# Patient Record
Sex: FEMALE | Race: WHITE
[De-identification: names, ages, dates, MRNs, and addresses within clinical notes are randomized per-mention and may not be internally consistent; named-entity substitution may affect disease eponyms.]

---

## 2017-01-29 ENCOUNTER — HOSPITAL ENCOUNTER (EMERGENCY)
Dept: HOSPITAL 62 - ER | Age: 52
Discharge: HOME | End: 2017-01-29
Payer: COMMERCIAL

## 2017-01-29 VITALS — SYSTOLIC BLOOD PRESSURE: 124 MMHG | DIASTOLIC BLOOD PRESSURE: 68 MMHG

## 2017-01-29 DIAGNOSIS — F17.210: ICD-10-CM
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
## 2017-01-29 DIAGNOSIS — M25.462: ICD-10-CM

## 2017-01-29 DIAGNOSIS — S80.212A: Primary | ICD-10-CM

## 2017-01-29 DIAGNOSIS — Z98.890: ICD-10-CM

## 2017-01-29 PROCEDURE — 90471 IMMUNIZATION ADMIN: CPT

## 2017-01-29 PROCEDURE — 90715 TDAP VACCINE 7 YRS/> IM: CPT

## 2017-01-29 PROCEDURE — 73562 X-RAY EXAM OF KNEE 3: CPT

## 2017-01-29 PROCEDURE — L1830 KO IMMOB CANVAS LONG PRE OTS: HCPCS

## 2017-01-29 PROCEDURE — 99283 EMERGENCY DEPT VISIT LOW MDM: CPT

## 2017-01-29 NOTE — ER DOCUMENT REPORT
HPI





- HPI


Patient complains to provider of: knee injury


Onset: Yesterday


Onset/Duration: Sudden


Quality of pain: Sharp


Pain Level: 5


Context: 


Patient states she tripped over someone else's leg falling on her left knee 

yesterday.  Patient complains of pain and swelling.


Associated Symptoms: Other - Left knee pain.  denies: Fever


Exacerbated by: Movement, Walking


Relieved by: Denies


Similar symptoms previously: No


Recently seen / treated by doctor: No





- ROS


ROS below otherwise negative: Yes


Systems Reviewed and Negative: Yes All other systems reviewed and negative





- CONSTITUTIONAL


Constitutional: DENIES: Fever





- MUSCULOSKELETAL


Musculoskeletal: REPORTS: Extremity pain, Swelling





- DERM


Skin Problems: Abrasion





- NURSING COMMENTS


Comment: Pt arrived to ED with c/o severe left knee pain s/p tripping yesterday 

at work and falling on left knee. Pt states she tried to ice and elevate leg 

but pain has increased. Pt has half dollar size abrasion to left knee with 

redness around site. Pt has increased pain with ambulations. Pt has slight 

swelling to left knee. Pt has even rise and fall of chest. Pt c/o severe pain 

to knee at this time. Family at bedside. Will continue to monitor.





Past Medical History





- General


Information source: Patient





- Social History


Smoking Status: Current Every Day Smoker


Cigarette use (# per day): Yes


Chew tobacco use (# tins/day): No


Frequency of alcohol use: Social


Drug Abuse: None


Occupation: restaurant


Family History: Reviewed & Not Pertinent


Patient has suicidal ideation: No


Patient has homicidal ideation: No


Renal/ Medical History: Denies: Hx Peritoneal Dialysis


Psychiatric Medical History: Reports: Hx Depression


Past Surgical History: Reports: Hx Orthopedic Surgery - b/l knees





- Immunizations


Hx Diphtheria, Pertussis, Tetanus Vaccination: No





Vertical Provider Document





- CONSTITUTIONAL


Exam Limitations: No Limitations


General Appearance: WD/WN, No Apparent Distress





- INFECTION CONTROL


TRAVEL OUTSIDE OF THE [place] IN LAST 30 DAYS: No





- HEENT


HEENT: Atraumatic, Normocephalic





- NECK


Neck: Normal Inspection





- RESPIRATORY


Respiratory: No Respiratory Distress


O2 Sat by Pulse Oximetry: 96





- CARDIOVASCULAR


Pulses: Normal: Dorsalis pedis





- BACK


Back: Normal Inspection





- MUSCULOSKELETAL/EXTREMETIES


Musculoskeletal/Extremeties: MAEW, Tender - Patient with tenderness over left 

patella, patient with small joint effusion to left knee.  No laxity with varus 

or valgus maneuvers.  Patellar tendon intact.  Patient with overlying abrasion 

to left knee, Edema





- NEURO


Level of Consciousness: Awake, Alert, Appropriate


Motor/Sensory: No Motor Deficit





- DERM


Integumentary: Warm, Dry


Notes: 


Abrasion to left knee





Course





- Vital Signs


Vital signs: 


 











Temp Pulse Resp BP Pulse Ox


 


 98.1 F   86   20   128/72 H  96 


 


 01/29/17 09:40  01/29/17 09:41  01/29/17 09:40  01/29/17 09:40  01/29/17 09:40














- Diagnostic Test


Radiology reviewed: Reports reviewed





Procedures





- Immobilization


  ** Left Knee


Pre-Proc Neuro Vasc Exam: Normal


Immobilizer type: Knee immobilizer


Performed by: PCT


Post-Proc Neuro Vasc Exam: Normal


Alignment checked and good: Yes





Discharge





- Discharge


Clinical Impression: 


 Knee effusion, left, Abrasion





Condition: Stable


Disposition: HOME, SELF-CARE


Instructions:  Knee Immobilizing Splint ([facility]), Sprained Knee ([facility]), Oral 

Narcotic Medication ([facility]), Suspected Internal Knee Injury ([facility]), Use of 

Crutches ([facility]), Ice & Elevation ([facility]), Tetanus Immunization Given ([facility]), 

Abrasions ([facility])


Additional Instructions: 


Return immediately for any new or worsening symptoms





Followup with your primary care provider, call tomorrow to make a followup 

appointment





Follow up with orthopedic Dr. for any continued pain or problems





Weightbearing as tolerated





Wear immobilizer for the next 4-5 days and then remove.


Prescriptions: 


Oxycodone HCl/Acetaminophen [Percocet 5-325 mg Tablet] 1 tab PO ASDIR PRN #15 

tablet


 PRN Reason: 


Forms:  Return to Work


Referrals: 


[facility] [Provider Group] - Follow up as needed

## 2019-02-23 ENCOUNTER — HOSPITAL ENCOUNTER (EMERGENCY)
Dept: HOSPITAL 62 - ER | Age: 54
Discharge: HOME | End: 2019-02-23
Payer: COMMERCIAL

## 2019-02-23 VITALS — SYSTOLIC BLOOD PRESSURE: 136 MMHG | DIASTOLIC BLOOD PRESSURE: 60 MMHG

## 2019-02-23 DIAGNOSIS — R51: ICD-10-CM

## 2019-02-23 DIAGNOSIS — R10.817: ICD-10-CM

## 2019-02-23 DIAGNOSIS — R10.9: ICD-10-CM

## 2019-02-23 DIAGNOSIS — R19.7: ICD-10-CM

## 2019-02-23 DIAGNOSIS — F17.210: ICD-10-CM

## 2019-02-23 DIAGNOSIS — R11.2: Primary | ICD-10-CM

## 2019-02-23 DIAGNOSIS — R05: ICD-10-CM

## 2019-02-23 DIAGNOSIS — R50.9: ICD-10-CM

## 2019-02-23 DIAGNOSIS — R09.81: ICD-10-CM

## 2019-02-23 DIAGNOSIS — R74.0: ICD-10-CM

## 2019-02-23 LAB
A TYPE INFLUENZA AG: NEGATIVE
ADD MANUAL DIFF: NO
ALBUMIN SERPL-MCNC: 4.4 G/DL (ref 3.5–5)
ALP SERPL-CCNC: 103 U/L (ref 38–126)
ALT SERPL-CCNC: 53 U/L (ref 9–52)
ANION GAP SERPL CALC-SCNC: 11 MMOL/L (ref 5–19)
AST SERPL-CCNC: 52 U/L (ref 14–36)
B INFLUENZA AG: NEGATIVE
BASOPHILS # BLD AUTO: 0 10^3/UL (ref 0–0.2)
BASOPHILS NFR BLD AUTO: 0.5 % (ref 0–2)
BILIRUB DIRECT SERPL-MCNC: 0.3 MG/DL (ref 0–0.4)
BILIRUB SERPL-MCNC: 0.4 MG/DL (ref 0.2–1.3)
BUN SERPL-MCNC: 21 MG/DL (ref 7–20)
CALCIUM: 9.2 MG/DL (ref 8.4–10.2)
CHLORIDE SERPL-SCNC: 97 MMOL/L (ref 98–107)
CO2 SERPL-SCNC: 29 MMOL/L (ref 22–30)
EOSINOPHIL # BLD AUTO: 0 10^3/UL (ref 0–0.6)
EOSINOPHIL NFR BLD AUTO: 0 % (ref 0–6)
ERYTHROCYTE [DISTWIDTH] IN BLOOD BY AUTOMATED COUNT: 13.5 % (ref 11.5–14)
GLUCOSE SERPL-MCNC: 137 MG/DL (ref 75–110)
HCT VFR BLD CALC: 49.9 % (ref 36–47)
HGB BLD-MCNC: 17.4 G/DL (ref 12–15.5)
LIPASE SERPL-CCNC: 72.1 U/L (ref 23–300)
LYMPHOCYTES # BLD AUTO: 0.6 10^3/UL (ref 0.5–4.7)
LYMPHOCYTES NFR BLD AUTO: 9.8 % (ref 13–45)
MCH RBC QN AUTO: 31.5 PG (ref 27–33.4)
MCHC RBC AUTO-ENTMCNC: 34.8 G/DL (ref 32–36)
MCV RBC AUTO: 91 FL (ref 80–97)
MONOCYTES # BLD AUTO: 0.4 10^3/UL (ref 0.1–1.4)
MONOCYTES NFR BLD AUTO: 6.4 % (ref 3–13)
NEUTROPHILS # BLD AUTO: 5.2 10^3/UL (ref 1.7–8.2)
NEUTS SEG NFR BLD AUTO: 83.3 % (ref 42–78)
PLATELET # BLD: 226 10^3/UL (ref 150–450)
POTASSIUM SERPL-SCNC: 4.2 MMOL/L (ref 3.6–5)
PROT SERPL-MCNC: 7 G/DL (ref 6.3–8.2)
RBC # BLD AUTO: 5.51 10^6/UL (ref 3.72–5.28)
SODIUM SERPL-SCNC: 137.4 MMOL/L (ref 137–145)
TOTAL CELLS COUNTED % (AUTO): 100 %
WBC # BLD AUTO: 6.3 10^3/UL (ref 4–10.5)

## 2019-02-23 PROCEDURE — 71046 X-RAY EXAM CHEST 2 VIEWS: CPT

## 2019-02-23 PROCEDURE — 83690 ASSAY OF LIPASE: CPT

## 2019-02-23 PROCEDURE — 36415 COLL VENOUS BLD VENIPUNCTURE: CPT

## 2019-02-23 PROCEDURE — 99283 EMERGENCY DEPT VISIT LOW MDM: CPT

## 2019-02-23 PROCEDURE — 80053 COMPREHEN METABOLIC PANEL: CPT

## 2019-02-23 PROCEDURE — 87804 INFLUENZA ASSAY W/OPTIC: CPT

## 2019-02-23 PROCEDURE — 85025 COMPLETE CBC W/AUTO DIFF WBC: CPT

## 2019-02-23 PROCEDURE — 96374 THER/PROPH/DIAG INJ IV PUSH: CPT

## 2019-02-23 NOTE — RADIOLOGY REPORT (SQ)
CLINICAL HISTORY:  cough, fever, vomiting 



COMPARISON: None.



TECHNIQUE: XR CHEST 2 VIEWS 2/23/2019 4:15 AM CST



FINDINGS: 



Cardiac silhouette is normal in size. Lungs are clear without

consolidation, atelectasis, mass or edema. There is no pleural

effusion. There is no pneumothorax. There are no acute osseous

findings.



IMPRESSION: 



Clear lungs.

## 2019-02-23 NOTE — ER DOCUMENT REPORT
Entered by MORIS FERGUSON SCRIBE  02/23/19 0422 





Acting as scribe for:GIAN ARNDT DO





ED Flu Like





- General


Chief Complaint: Flu Symptoms


Stated Complaint: FLU LIKE SYMPTOMS


Time Seen by Provider: 02/23/19 04:08


Primary Care Provider: 


ALFREDO BRIDGES MD [Primary Care Provider] - Follow up as needed


Mode of Arrival: Ambulatory


Information source: Patient


Notes: 


Patient is a 53 year old female presenting to the emergency department complain

ing of multiple symptoms including cough, congestion and a fever onset 4 days 

ago. Patient states she initially attributed her cough to a chest cold but 

states her symptoms have been worsening. She states she is unable to hold any 

food or liquids down further stating she has been vomiting and having diarrhea 

since earlier this evening. She also complains of abdominal pain but attributes 

this to coughing and vomiting further stating "I'm going to get a six pack". She

also reports a fever of 102.0 as well as a headache and nasal congestion. She 

reports taking her parents to the doctors office 4 days ago. She denies any 

hematemeis or blood in stool. 





TRAVEL OUTSIDE OF THE [place] IN LAST 30 DAYS: No





- Related Data


Allergies/Adverse Reactions: 


                                        





No Known Allergies Allergy (Unverified 01/29/17 09:44)


   











Past Medical History





- General


Information source: Patient





- Social History


Smoking Status: Current Every Day Smoker


Cigarette use (# per day): Yes


Smoking Education Provided: No


Frequency of alcohol use: Social


Drug Abuse: None


Family History: Reviewed & Not Pertinent


Psychiatric Medical History: Reports: Hx Depression


Past Surgical History: Reports: Hx Orthopedic Surgery - b/l knees





- Immunizations


Hx Diphtheria, Pertussis, Tetanus Vaccination: No





Review of Systems





- Review of Systems


Constitutional: See HPI


EENT: See HPI


Cardiovascular: No symptoms reported


Respiratory: See HPI, Cough


Gastrointestinal: See HPI, Abdominal pain, Diarrhea, Vomiting


Genitourinary: No symptoms reported


Female Genitourinary: No symptoms reported


Musculoskeletal: No symptoms reported


Skin: No symptoms reported


Hematologic/Lymphatic: No symptoms reported


Neurological/Psychological: No symptoms reported


-: Yes All other systems reviewed and negative





Physical Exam





- Vital signs


Vitals: 


                                        











Temp Pulse Resp BP Pulse Ox


 


 97.9 F   88   17   139/79 H  93 


 


 02/23/19 03:48  02/23/19 03:48  02/23/19 03:48  02/23/19 03:48  02/23/19 03:48














- Notes


Notes: 


GENERAL: Alert, interacts well. No acute distress but does appear mildly 

uncomfortable.


HEAD: Normocephalic, atraumatic.


EYES: Pupils equal, round, and reactive to light. Extraocular movements intact.


ENT: Oral mucosa moist, tongue midline. Mild nasal congestion. 


NECK: Full range of motion. Supple. Trachea midline.


LUNGS: Clear to auscultation bilaterally, no wheezes, rales, or rhonchi. No 

respiratory distress.


HEART: Regular rate and rhythm. No murmurs, gallops, or rubs.


ABDOMEN: Soft, diffusely very mild tenderness to palpation. Non-distended. Bowel

sounds present in all 4 quadrants.


EXTREMITIES: Moves all 4 extremities spontaneously. 


NEUROLOGICAL: Alert and oriented x3. Normal speech. 


PSYCH: Normal affect, normal mood.


SKIN: Warm, dry, normal turgor. No rashes or lesions noted.











Course





- Re-evaluation


Re-evalutation: 





02/23/19 05:44


CBC unremarkable, CMP grossly unremarkable, AST and ALT are both slightly 

elevated, lipase is normal, flu swabs are negative, chest x-ray shows no acute 

process.  Vomiting has stopped, patient is feeling somewhat better.  Patient is 

being given an oral challenge of water at this time.  If she is able to hold it 

down for the next 30 minutes she will be discharged home with Zofran and Mariely hinson.





- Vital Signs


Vital signs: 


                                        











Temp Pulse Resp BP Pulse Ox


 


 97.9 F   88   17   139/79 H  93 


 


 02/23/19 03:48  02/23/19 03:48  02/23/19 03:48  02/23/19 03:48  02/23/19 03:48














- Laboratory


Result Diagrams: 


                                 02/23/19 04:25





                                 02/23/19 04:25


Laboratory results interpreted by me: 


                                        











  02/23/19 02/23/19





  04:25 04:25


 


RBC  5.51 H 


 


Hgb  17.4 H 


 


Hct  49.9 H 


 


Seg Neutrophils %  83.3 H 


 


Lymphocytes %  9.8 L 


 


Chloride   97 L


 


BUN   21 H


 


Creatinine   0.47 L


 


Glucose   137 H


 


AST   52 H


 


ALT   53 H














Discharge





- Discharge


Clinical Impression: 


 Nausea vomiting and diarrhea





Condition: Stable


Disposition: HOME, SELF-CARE


Additional Instructions: 


Vomiting





     Vomiting (or nausea without vomiting) can be caused by many other different

problems. It can mean that something's wrong with the stomach, such as ulcers or

inflammation or the intestinal tract, such as appendicitis.  But it can also be 

a symptom of a problem that has nothing to do with the stomach or intestines. 

Vomiting is common with severe headaches, earaches, tonsillitis, and kidney 

infections, etc. We see it with pneumonia or heart attacks. Drugs can cause 

nausea and vomiting. Many abdominal problems cause vomiting; for example, 

gallstones, kidney stones, pancreatitis, and intestinal obstruction (blocked 

bowels).


     In most cases, curing the vomiting depends on fixing the problem that 

caused it. For temporary relief, we may use an anti-nausea medicine. For home 

use, we can prescribe suppositories, chewable pills, pills that dissolve in the 

mouth, or liquid anti-nausea drugs. If the vomiting seems to be caused by a 

problem in the stomach, acid-suppressing drugs may be prescribed as well.


     It's important to avoid dehydration. Sip small amounts of clear liquids 

(soft drinks, tea, broth, etc) . Try to take fluids frequently even if you are 

vomiting to prevent dehydration.  Take increasing amounts of fluid and when 

liquids are being consumed successfully, advance to small amounts of bland food 

(toast, soups, mashed potatoes, etc.) until you are able to resume a regular 

diet.  Avoid aspirin, tobacco, and alcohol.


     If the vomiting worsens, if the problem that's making you vomit worsens, or

if there's evidence of bleeding in the stomach (such as black, tarry stool, or 

bloody or black vomit), you should return immediately. Also, return if abdominal

pain worsens or becomes localized to one area or you develop high fever.  Call 

your doctor if you aren't improved in 24 hours.





Diarrhea





     Diarrhea means frequent, watery stools. There are many causes. Any problem 

that keeps the intestinal tract from absorbing water from the stool can lead to 

diarrhea. 


     A sudden new diarrhea problem is usually caused by a virus, food sen

sitivity, toxic bacteria, or drugs. In this case, we expect the problem to go 

away soon. Testing is done only if you seem seriously ill from the diarrhea.


     If you have chronic diarrhea, or diarrhea that keeps coming back, we need 

to find out why. Chronic diarrhea can be due to inflammation of the bowels such 

as Crohn's disease or ulcerative colitis, food sensitivity such as intolerance 

to lactose or wheat protein, irritable bowel syndrome, and other problems. If 

your diarrhea is a significant problem but it's not clear why you have it, we'll

refer you to a specialist for further testing.


     During an episode of diarrhea, drink small amounts (two to six ounces) of 

clear liquids (soft drinks, sport drinks, herb teas, broth, etc).  Take fluids 

frequently to prevent dehydration. It's usually not a problem to take mild anti-

diarrhea medication such as Imodium.  Kaopectate and Pepto-Bismol can turn your 

poop black. As the diarrhea eases, advance to small amounts of bland food 

(mashed potato, toast) for 24 hours.


     Call the physician if blood appears in your vomit or stool, if vomiting 

lasts longer than 24 hours, if the abdominal pain worsens or becomes localized 

to one area, if you develop high fever, or if you become lightheaded and weak.





Prescriptions: 


Ondansetron [Zofran Odt 4 mg Tablet] 1 - 2 tab PO Q4H PRN #15 tab.rapdis


 PRN Reason: For Nausea/Vomiting


Promethazine HCl [Phenergan 25 mg Tablet] 1 - 2 tab PO Q6H PRN #15 tablet


 PRN Reason: 


Forms:  Return to Work


Referrals: 


ALFREDO BRIDGES MD [Primary Care Provider] - Follow up as needed


Scribe Attestation: 





02/23/19 05:46


I personally performed the services described in the documentation, reviewed and

edited the documentation which was dictated to the scribe in my presence, and it

accurately records my words and actions.





I personally performed the services described in the documentation, reviewed and

edited the documentation which was dictated to the scribe in my presence, and it

accurately records my words and actions.

## 2020-05-29 ENCOUNTER — HOSPITAL ENCOUNTER (EMERGENCY)
Dept: HOSPITAL 62 - ER | Age: 55
Discharge: HOME | End: 2020-05-29
Payer: COMMERCIAL

## 2020-05-29 VITALS — SYSTOLIC BLOOD PRESSURE: 153 MMHG | DIASTOLIC BLOOD PRESSURE: 83 MMHG

## 2020-05-29 DIAGNOSIS — Z20.828: ICD-10-CM

## 2020-05-29 DIAGNOSIS — R91.1: ICD-10-CM

## 2020-05-29 DIAGNOSIS — R74.8: Primary | ICD-10-CM

## 2020-05-29 DIAGNOSIS — R10.84: ICD-10-CM

## 2020-05-29 DIAGNOSIS — M79.10: ICD-10-CM

## 2020-05-29 DIAGNOSIS — R50.9: ICD-10-CM

## 2020-05-29 DIAGNOSIS — R11.2: ICD-10-CM

## 2020-05-29 DIAGNOSIS — R53.81: ICD-10-CM

## 2020-05-29 DIAGNOSIS — F17.200: ICD-10-CM

## 2020-05-29 DIAGNOSIS — R19.7: ICD-10-CM

## 2020-05-29 DIAGNOSIS — R10.9: ICD-10-CM

## 2020-05-29 LAB
ADD MANUAL DIFF: NO
ALBUMIN SERPL-MCNC: 4.7 G/DL (ref 3.5–5)
ALP SERPL-CCNC: 88 U/L (ref 38–126)
ANION GAP SERPL CALC-SCNC: 11 MMOL/L (ref 5–19)
APPEARANCE UR: CLEAR
APTT PPP: YELLOW S
AST SERPL-CCNC: 27 U/L (ref 14–36)
BASOPHILS # BLD AUTO: 0.1 10^3/UL (ref 0–0.2)
BASOPHILS NFR BLD AUTO: 0.6 % (ref 0–2)
BILIRUB DIRECT SERPL-MCNC: 0 MG/DL (ref 0–0.4)
BILIRUB SERPL-MCNC: 0.8 MG/DL (ref 0.2–1.3)
BILIRUB UR QL STRIP: NEGATIVE
BUN SERPL-MCNC: 20 MG/DL (ref 7–20)
CALCIUM: 9.8 MG/DL (ref 8.4–10.2)
CHLORIDE SERPL-SCNC: 95 MMOL/L (ref 98–107)
CO2 SERPL-SCNC: 28 MMOL/L (ref 22–30)
EOSINOPHIL # BLD AUTO: 0 10^3/UL (ref 0–0.6)
EOSINOPHIL NFR BLD AUTO: 0.1 % (ref 0–6)
ERYTHROCYTE [DISTWIDTH] IN BLOOD BY AUTOMATED COUNT: 13.4 % (ref 11.5–14)
GLUCOSE SERPL-MCNC: 122 MG/DL (ref 75–110)
GLUCOSE UR STRIP-MCNC: NEGATIVE MG/DL
HCT VFR BLD CALC: 49.6 % (ref 36–47)
HGB BLD-MCNC: 17.3 G/DL (ref 12–15.5)
KETONES UR STRIP-MCNC: NEGATIVE MG/DL
LYMPHOCYTES # BLD AUTO: 1.5 10^3/UL (ref 0.5–4.7)
LYMPHOCYTES NFR BLD AUTO: 11.4 % (ref 13–45)
MCH RBC QN AUTO: 32.2 PG (ref 27–33.4)
MCHC RBC AUTO-ENTMCNC: 34.9 G/DL (ref 32–36)
MCV RBC AUTO: 92 FL (ref 80–97)
MONOCYTES # BLD AUTO: 1 10^3/UL (ref 0.1–1.4)
MONOCYTES NFR BLD AUTO: 7.7 % (ref 3–13)
NEUTROPHILS # BLD AUTO: 10.8 10^3/UL (ref 1.7–8.2)
NEUTS SEG NFR BLD AUTO: 80.2 % (ref 42–78)
NITRITE UR QL STRIP: NEGATIVE
PH UR STRIP: 7 [PH] (ref 5–9)
PLATELET # BLD: 382 10^3/UL (ref 150–450)
POTASSIUM SERPL-SCNC: 3.5 MMOL/L (ref 3.6–5)
PROT SERPL-MCNC: 7.7 G/DL (ref 6.3–8.2)
PROT UR STRIP-MCNC: 30 MG/DL
RBC # BLD AUTO: 5.38 10^6/UL (ref 3.72–5.28)
SP GR UR STRIP: 1.02
TOTAL CELLS COUNTED % (AUTO): 100 %
UROBILINOGEN UR-MCNC: NEGATIVE MG/DL (ref ?–2)
WBC # BLD AUTO: 13.4 10^3/UL (ref 4–10.5)

## 2020-05-29 PROCEDURE — 81001 URINALYSIS AUTO W/SCOPE: CPT

## 2020-05-29 PROCEDURE — 83690 ASSAY OF LIPASE: CPT

## 2020-05-29 PROCEDURE — 36415 COLL VENOUS BLD VENIPUNCTURE: CPT

## 2020-05-29 PROCEDURE — 87635 SARS-COV-2 COVID-19 AMP PRB: CPT

## 2020-05-29 PROCEDURE — 99284 EMERGENCY DEPT VISIT MOD MDM: CPT

## 2020-05-29 PROCEDURE — 96361 HYDRATE IV INFUSION ADD-ON: CPT

## 2020-05-29 PROCEDURE — 85025 COMPLETE CBC W/AUTO DIFF WBC: CPT

## 2020-05-29 PROCEDURE — 80053 COMPREHEN METABOLIC PANEL: CPT

## 2020-05-29 PROCEDURE — 96374 THER/PROPH/DIAG INJ IV PUSH: CPT

## 2020-05-29 PROCEDURE — 74177 CT ABD & PELVIS W/CONTRAST: CPT

## 2020-05-29 PROCEDURE — C9803 HOPD COVID-19 SPEC COLLECT: HCPCS

## 2020-05-29 PROCEDURE — 76705 ECHO EXAM OF ABDOMEN: CPT

## 2020-05-29 NOTE — ER DOCUMENT REPORT
Doctor's Note


Notes: 





05/29/20 12:01


This is a middle-aged female seen primarily by midlevel provider.  Clinically 

she appears to have a low-grade pancreatitis.  Gallbladder ultrasound was 

negative for stones or significant ductal dilatation.  She also had a CT abdomen

pelvis which showed nonspecific findings only.  Her LFTs are normal.  She denies

abuse of alcohol.  No recent changes medications.





I evaluated patient with midlevel provider.  At this time I believe she can be 

reasonably followed outpatient basis with repeat labs to be checked by her 

primary care physician within next 3 to 4 days.  She can return here for new or 

worsening symptoms.  She is to abstain from any intake of alcohol.  She will be 

treated symptomatically at this time.

## 2020-05-29 NOTE — RADIOLOGY REPORT (SQ)
EXAM DESCRIPTION:  CT ABD/PELVIS WITH IV ONLY



IMAGES COMPLETED DATE/TIME:  5/29/2020 11:02 am



REASON FOR STUDY:  abd pain, elevated lipase



COMPARISON:  None.



TECHNIQUE:  CT scan of the abdomen and pelvis performed using helical scanning technique with dynamic
 intravenous contrast injection.  No oral contrast. Images reviewed with lung, soft tissue, and bone 
windows. Reconstructed coronal and sagittal MPR images reviewed. Delayed images for evaluation of the
 urinary system also acquired. All images stored on PACS.

All CT scanners at this facility use dose modulation, iterative reconstruction, and/or weight based d
osing when appropriate to reduce radiation dose to as low as reasonably achievable (ALARA).

[facility]: Dose Right  [facility]: CareDose    [facility]: Dose Right    [facility]: Teradose 4D    [facility]: Smart Technologies



CONTRAST TYPE AND DOSE:  Contrast/concentration: Isovue 350.00 mg/ml; Total Contrast Delivered: 62.0 
ml; Total Saline Delivered: 65.0 ml



RENAL FUNCTION:  GFR > 60.



RADIATION DOSE:  CT Rad equipment meets quality standard of care and radiation dose reduction techniq
ues were employed. CTDIvol: 4.8 - 5.3 mGy. DLP: 520 mGy-cm.



LIMITATIONS:  None.



FINDINGS:  LOWER CHEST: Subpleural nodular opacity in the right lower lobe (image 10 of series 3) donte
t measures 11 x 11 mm.

LIVER: The 9 x 9 mm hypodense lesion in the left hepatic lobe (image 16 of series 3) is too small to 
characterize.  The geographic area of hypoattenuation along the falciform ligament could represent fo
sherri hepatic steatosis or profusion variant.  There is a punctate calcification within segment 2 of th
e liver (image 16 of series 3).  The portal veins are patent.

SPLEEN: No splenomegaly or splenic mass.

PANCREAS: No acute abnormality of the pancreas ; in particular, no peripancreatic inflammation, pancr
eatic ductal dilatation, or peripancreatic fluid collection.

GALLBLADDER: No abnormality that is apparent on CT.

ADRENAL GLANDS: Nonspecific mild nodular enlargement of the left adrenal gland that could represent a
denomatous hyperplasia.

RIGHT KIDNEY AND URETER: No solid mass, hydronephrosis, nephrolithiasis, hydroureter or ureterolithia
sis.

LEFT KIDNEY AND URETER: 13 x 8 mm calculus within an upper pole calyx, 4 mm calculus within a lower p
ole calyx, and parapelvic cyst.  There is no solid mass, hydronephrosis, hydroureter or ureterolithia
sis.

AORTA AND VESSELS: No aneurysm or dissection of the abdominal aorta.  Variant retroaortic left renal 
vein.

RETROPERITONEUM: No retroperitoneal adenopathy, hemorrhage or mass.

BOWEL AND PERITONEAL CAVITY: No bowel obstruction, bowel wall thickening or pericolonic/ perienteric 
inflammation.  No mesenteric adenopathy, free intraperitoneal fluid or mesenteric/ omental inflammati
on.

APPENDIX: Normal.

PELVIS: No abnormality of the uterus or adnexa that is apparent on CT.  The urinary bladder is disten
ded and normal in appearance.

ABDOMINAL WALL: No mass or hernia.

BONES: No fracture or osseous lesion.

OTHER: No other finding.



IMPRESSION:  1.  11 x 11 mm subpleural nodular opacity in the right lower lobe (image 10 of series 3)
 that could represent a nodule or round atelectasis.  A follow-up CT of the chest in 3 months is tracey
mmended.

2.  No acute abnormality of the pancreas ; in particular, no peripancreatic inflammation, pancreatic 
ductal dilatation, or peripancreatic fluid collection.

3.  13 x 8 mm left upper pole caliceal calculus and 4 mm left lower pole caliceal calculus.



TECHNICAL DOCUMENTATION:  JOB ID:  [id number]

Quality ID # [id number]: Final reports with documentation of one or more dose reduction techniques (e.g., Au
tomated exposure control, adjustment of the mA and/or kV according to patient size, use of iterative 
reconstruction technique)

 2011 [employer]- All Rights Reserved



Reading location - IP/workstation name: [email or web address]

## 2020-05-29 NOTE — RADIOLOGY REPORT (SQ)
EXAM DESCRIPTION:  U/S ABDOMEN LIMITED W/O DOP



IMAGES COMPLETED DATE/TIME:  5/29/2020 10:04 am



REASON FOR STUDY:  abd pain, elevated lipase



COMPARISON:  None.



TECHNIQUE:  Dynamic and static grayscale images acquired of the abdomen and recorded on PACS. Additio
nal selected color Doppler and spectral images recorded.



LIMITATIONS:  None.



FINDINGS:  PANCREAS: The visualized portions of the pancreas appear normal.

LIVER: Normal contour and echotexture.

LIVER VASCULATURE: Hepatopetal directional flow in the portal veins.

GALLBLADDER: The gallbladder is partially contracted and its wall measures 1.3 mm in thickness.  Ther
e is no cholelithiasis, sludge or pericholecystic fluid.

ULTRASOUND-DETECTED WEST'S SIGN: Negative.

INTRAHEPATIC DUCTS AND COMMON DUCT: The common bile duct measures 9 mm in diameter.  There is no dila
tation of the intrahepatic bile ducts.

INFERIOR VENA CAVA: Patent.

AORTA: No aneurysm.

RIGHT KIDNEY:  The right kidney measures 10.7 cm in length.  There is no hydronephrosis.

PERITONEAL AND RIGHT PLEURAL SPACE: No ascites or effusions.

OTHER: No other findings.



IMPRESSION:  Borderline dilatation of the common bile duct without an associated abnormality of the g
allbladder or dilatation of the intrahepatic bile ducts.



TECHNICAL DOCUMENTATION:  JOB ID:  [id number]

 2011 [employer]- All Rights Reserved



Reading location - IP/workstation name: [email or web address]

## 2020-05-29 NOTE — ER DOCUMENT REPORT
ED GI/





- General


Chief Complaint: Nausea/Vomiting/Diarrhea


Stated Complaint: NAUSEA,VOMITING,DIARRHEA


Time Seen by Provider: 05/29/20 08:15


Primary Care Provider: 


ALFREDO BRIDGES MD [Primary Care Provider] - Follow up in 3-5 days


Mode of Arrival: Ambulatory


Information source: Patient


Notes: 





Patient presents with a 3-day history of nausea vomiting diarrhea.  Patient 

reports vomiting once and having diarrhea once today.  Patient complains of 

generalized abdominal cramping, malaise and body aches.  Patient states that she

did have a fever of 101 at home yesterday.  Patient denies any cough or cold 

symptoms.


TRAVEL OUTSIDE OF THE [place] IN LAST 30 DAYS: No





- HPI


Patient complains to provider of: Abdominal pain, Diarrhea, Vomiting


Onset: Other - 3 days


Timing/Duration: Gradual


Quality of pain: Achy


Pain Level: 2


Location: Other - Generalized abdomen


Associated symptoms: Diarrhea, Fever, Nausea, Vomiting.  denies: Loss of 

appetite, Urinary hesitancy, Urinary frequency, Urinary retention, Urinary 

urgency


Exacerbated by: Denies


Relieved by: Denies


Similar symptoms previously: No


Recently seen / treated by doctor: No





- Related Data


Allergies/Adverse Reactions: 


                                        





No Known Allergies Allergy (Unverified 01/29/17 09:44)


   











Past Medical History





- General


Information source: Patient





- Social History


Smoking Status: Current Every Day Smoker


Frequency of alcohol use: Social


Drug Abuse: None


Occupation: Foodservice


Family History: Reviewed & Not Pertinent


Patient has homicidal ideation: No


Renal/ Medical History: Denies: Hx Peritoneal Dialysis


Psychiatric Medical History: Reports: Hx Anxiety, Hx Depression


Past Surgical History: Reports: Hx Orthopedic Surgery - b/l knees





- Immunizations


Hx Diphtheria, Pertussis, Tetanus Vaccination: No





Review of Systems





- Review of Systems


Constitutional: Fever, Weakness.  denies: Recent illness


EENT: No symptoms reported


Cardiovascular: No symptoms reported.  denies: Chest pain


Respiratory: No symptoms reported.  denies: Cough, Short of breath


Gastrointestinal: Abdominal pain, Diarrhea, Nausea, Vomiting.  denies: Black 

stools, Rectal bleeding


Genitourinary: No symptoms reported.  denies: Dysuria


Female Genitourinary: No symptoms reported


Musculoskeletal: No symptoms reported


Skin: No symptoms reported


Hematologic/Lymphatic: No symptoms reported


Neurological/Psychological: No symptoms reported





Physical Exam





- Vital signs


Vitals: 


                                        











Temp Pulse Resp BP Pulse Ox


 


 98.1 F   64   14   144/73 H  97 


 


 05/29/20 07:00  05/29/20 07:00  05/29/20 07:00  05/29/20 07:00  05/29/20 07:00














- General


General appearance: Appears well, Alert


In distress: None





- HEENT


Head: Normocephalic, Atraumatic


Eyes: Normal


Conjunctiva: Normal


Nasal: Normal


Mouth/Lips: Normal


Neck: Normal, Supple





- Respiratory


Respiratory status: No respiratory distress


Chest status: Nontender


Breath sounds: Normal.  No: Rales, Rhonchi, Stridor, Wheezing


Chest palpation: Normal





- Cardiovascular


Rhythm: Regular


Heart sounds: S1 appreciated, S2 appreciated


Murmur: No





- Abdominal


Inspection: Normal


Distension: No distension


Bowel sounds: Normal


Tenderness: Tender - Diffuse abdominal tenderness.  No: Guarding


Organomegaly: No organomegaly





- Back


Back: Normal, Nontender.  No: CVA tenderness





- Extremities


General upper extremity: Normal inspection, Normal ROM


General lower extremity: Normal inspection, Normal ROM





- Neurological


Neuro grossly intact: Yes


Cognition: Normal


[place] Coma Scale Eye Opening: Spontaneous


[place] Coma Scale Verbal: Oriented


Be Coma Scale Motor: Obeys Commands


Be Coma Scale Total: 15





- Psychological


Associated symptoms: Normal affect, Normal mood





- Skin


Skin Temperature: Warm


Skin Moisture: Dry


Skin Color: Normal





Course





- Re-evaluation


Re-evalutation: 





05/29/20 10:28


Consulted with Dr. Sinclair regarding patient's ultrasound report, recommends 

adding on CT scan to evaluate for any pancreatic mass.


05/29/20 12:07


Patient without any additional vomiting or diarrhea while here in the 

department.  Patient states that she does have some continued cramping although 

the pain is manageable.  No concern for any biliary obstruction or pancreatic 

mass.  Patient with elevated lipase although does not at this time meet 

diagnostic criteria for pancreatitis.  Patient advised of findings on CT scan.  

Patient encouraged to follow-up with her primary doctor to have a repeat CT scan

in 3 months.  Patient also encouraged to see her primary doctor to have her 

laboratory tests rechecked including her lipase test.  Patient encouraged to 

avoid any alcohol use.  Patient does drink alcohol but does not do so daily.  

Discussed worsening signs or symptoms that patient should return immediately 

for.  Patient verbalized understanding is agreeable with discharge plan of care.





- Vital Signs


Vital signs: 


                                        











Temp Pulse Resp BP Pulse Ox


 


 98.2 F   61   16   153/83 H  99 


 


 05/29/20 13:24  05/29/20 13:24  05/29/20 13:24  05/29/20 13:24  05/29/20 13:24














- Laboratory


Result Diagrams: 


                                 05/29/20 07:30





                                 05/29/20 07:30


Laboratory results interpreted by me: 


                                        











  05/29/20 05/29/20 05/29/20





  07:30 07:30 07:30


 


WBC  13.4 H  


 


RBC  5.38 H  


 


Hgb  17.3 H  


 


Hct  49.6 H  


 


Lymph % (Auto)  11.4 L  


 


Absolute Neuts (auto)  10.8 H  


 


Seg Neutrophils %  80.2 H  


 


Sodium   133.5 L 


 


Potassium   3.5 L 


 


Chloride   95 L 


 


Glucose   122 H 


 


Lipase    841.1 H


 


Urine Protein   


 


Urine Blood   














  05/29/20





  08:45


 


WBC 


 


RBC 


 


Hgb 


 


Hct 


 


Lymph % (Auto) 


 


Absolute Neuts (auto) 


 


Seg Neutrophils % 


 


Sodium 


 


Potassium 


 


Chloride 


 


Glucose 


 


Lipase 


 


Urine Protein  30 H


 


Urine Blood  MODERATE H











05/29/20 12:07





                              Labs- All tests 24 hr











  05/29/20 05/29/20 05/29/20





  07:30 07:30 07:30


 


WBC  13.4 H  


 


RBC  5.38 H  


 


Hgb  17.3 H  


 


Hct  49.6 H  


 


MCV  92  


 


MCH  32.2  


 


MCHC  34.9  


 


RDW  13.4  


 


Plt Count  382  


 


Lymph % (Auto)  11.4 L  


 


Mono % (Auto)  7.7  


 


Eos % (Auto)  0.1  


 


Baso % (Auto)  0.6  


 


Absolute Neuts (auto)  10.8 H  


 


Absolute Lymphs (auto)  1.5  


 


Absolute Monos (auto)  1.0  


 


Absolute Eos (auto)  0.0  


 


Absolute Basos (auto)  0.1  


 


Seg Neutrophils %  80.2 H  


 


Sodium   133.5 L 


 


Potassium   3.5 L 


 


Chloride   95 L 


 


Carbon Dioxide   28 


 


Anion Gap   11 


 


BUN   20 


 


Creatinine   0.55 


 


Est GFR ( Amer)   > 60 


 


Est GFR (MDRD) Non-Af   > 60 


 


Glucose   122 H 


 


Calcium   9.8 


 


Total Bilirubin   0.8 


 


Direct Bilirubin   0.0 


 


Neonat Total Bilirubin   Not Reportable 


 


Neonat Direct Bilirubin   Not Reportable 


 


Neonat Indirect Bili   Not Reportable 


 


AST   27 


 


ALT   16 


 


Alkaline Phosphatase   88 


 


Total Protein   7.7 


 


Albumin   4.7 


 


Lipase    841.1 H


 


Urine Color   


 


Urine Appearance   


 


Urine pH   


 


Ur Specific Gravity   


 


Urine Protein   


 


Urine Glucose (UA)   


 


Urine Ketones   


 


Urine Blood   


 


Urine Nitrite   


 


Urine Bilirubin   


 


Urine Urobilinogen   


 


Ur Leukocyte Esterase   


 


Urine WBC (Auto)   


 


Urine RBC (Auto)   


 


Urine Bacteria (Auto)   


 


Squamous Epi Cells Auto   


 


Urine Mucus (Auto)   


 


Urine Ascorbic Acid   














  05/29/20





  08:45


 


WBC 


 


RBC 


 


Hgb 


 


Hct 


 


MCV 


 


MCH 


 


MCHC 


 


RDW 


 


Plt Count 


 


Lymph % (Auto) 


 


Mono % (Auto) 


 


Eos % (Auto) 


 


Baso % (Auto) 


 


Absolute Neuts (auto) 


 


Absolute Lymphs (auto) 


 


Absolute Monos (auto) 


 


Absolute Eos (auto) 


 


Absolute Basos (auto) 


 


Seg Neutrophils % 


 


Sodium 


 


Potassium 


 


Chloride 


 


Carbon Dioxide 


 


Anion Gap 


 


BUN 


 


Creatinine 


 


Est GFR ( Amer) 


 


Est GFR (MDRD) Non-Af 


 


Glucose 


 


Calcium 


 


Total Bilirubin 


 


Direct Bilirubin 


 


Neonat Total Bilirubin 


 


Neonat Direct Bilirubin 


 


Neonat Indirect Bili 


 


AST 


 


ALT 


 


Alkaline Phosphatase 


 


Total Protein 


 


Albumin 


 


Lipase 


 


Urine Color  YELLOW


 


Urine Appearance  CLEAR


 


Urine pH  7.0


 


Ur Specific Gravity  1.016


 


Urine Protein  30 H


 


Urine Glucose (UA)  NEGATIVE


 


Urine Ketones  NEGATIVE


 


Urine Blood  MODERATE H


 


Urine Nitrite  NEGATIVE


 


Urine Bilirubin  NEGATIVE


 


Urine Urobilinogen  NEGATIVE


 


Ur Leukocyte Esterase  NEGATIVE


 


Urine WBC (Auto)  2


 


Urine RBC (Auto)  7


 


Urine Bacteria (Auto)  1+


 


Squamous Epi Cells Auto  1


 


Urine Mucus (Auto)  RARE


 


Urine Ascorbic Acid  NEGATIVE














- Diagnostic Test


Radiology reviewed: Reports reviewed





Discharge





- Discharge


Clinical Impression: 


 Elevated lipase, Nausea vomiting and diarrhea, Pulmonary nodule, Encounter for 

screening laboratory testing for COVID-19 virus





Condition: Stable


Disposition: HOME, SELF-CARE


Instructions:  COVID-19 Guidance for Persons Under Investigation, Antinausea 

Medication ([facility]), Diarrhea, Nonspecific ([facility]), Growth or Mass, Pending Workup 

([facility]), Intravenous (IV) Fluids ([facility]), Pancreatitis ([facility]), Vomiting ([facility])


Additional Instructions: 


Return immediately for any new or worsening symptoms





Followup with your primary care provider, call tomorrow to make a followup 

appointment





Your CT scan showed an incidental finding of a pulmonary nodule.  Is recommended

that you have a repeat CT scan in 3 months.  Your primary doctor can order this 

test for you.





Avoid any alcohol








As a person under investigation for Covid 19, the [place] department of 

Health and Human Services, division of public health advises you to adhere to 

the following guidance until your test results are reported to you.  If your 

test result is positive, you will receive additional information from your 

provider and your local health department at that time.





Remain at home until you are cleared by the health provider or public health 

authorities.





Keep a log of visitors to your home, notify any visitors to your home of your 

isolation status.





If you plan to move to a new address or leave the county, notify the local 

health department in your County.





Call your doctor or seek care if you have an urgent medical need.  Before 

seeking medical care, call ahead to get instructions from the provider before 

arriving at the medical office clinic or hospital.  Notify them that you are 

being tested for the virus that causes Covid 19 so that arrangements can be 

made, as necessary, to prevent transmission to others in the healthcare setting.

 Next, notify the local health department in your county.





If a medical emergency arises and you need to call 911, inform the first resp

onders that you are being tested for the virus that causes Covid 19.  Next, 

notify the local health department in your county.


Prescriptions: 


Dicyclomine HCl [Bentyl 20 mg Tablet] 20 mg PO QID PRN #16 tablet


 PRN Reason: 


Ondansetron [Zofran Odt 4 mg Tablet] 1 tab PO Q6H #15 tab.rapdis


Forms:  Return to Work


Referrals: 


ALFREDO BRIDGES MD [Primary Care Provider] - Follow up in 3-5 days